# Patient Record
Sex: MALE | Race: WHITE | URBAN - METROPOLITAN AREA
[De-identification: names, ages, dates, MRNs, and addresses within clinical notes are randomized per-mention and may not be internally consistent; named-entity substitution may affect disease eponyms.]

---

## 2018-10-15 ENCOUNTER — OFFICE VISIT (OUTPATIENT)
Dept: INTERNAL MEDICINE CLINIC | Age: 33
End: 2018-10-15

## 2018-10-15 VITALS
HEART RATE: 59 BPM | TEMPERATURE: 97.8 F | OXYGEN SATURATION: 99 % | DIASTOLIC BLOOD PRESSURE: 69 MMHG | SYSTOLIC BLOOD PRESSURE: 113 MMHG | HEIGHT: 71 IN | BODY MASS INDEX: 23.24 KG/M2 | WEIGHT: 166 LBS | RESPIRATION RATE: 16 BRPM

## 2018-10-15 DIAGNOSIS — Z00.00 ROUTINE ADULT HEALTH MAINTENANCE: Primary | ICD-10-CM

## 2018-10-15 RX ORDER — ASCORBIC ACID 500 MG
TABLET ORAL DAILY
COMMUNITY

## 2018-10-15 RX ORDER — MULTIVITAMIN WITH IRON
1 TABLET ORAL DAILY
COMMUNITY

## 2018-10-15 NOTE — MR AVS SNAPSHOT
[address] 
[phone] Patient: Michelle Mendoza MRN: [id number] Visit Information Date & Time Provider Department Dept. Phone Encounter #  
 10/15/2018  9:00 AM Elisa Corrigan, [address] [id number] Follow-up Instructions Return in about 1 year (around 10/15/2019). Upcoming Health Maintenance Date Due DTaP/Tdap/Td series (1 - Tdap) 5/17/2005 Influenza Age 5 to Adult 8/1/2018 Allergies as of 10/15/2018  Review Complete On: 10/15/2018 By: Jaxon Corona III, DO Severity Noted Reaction Type Reactions Ceclor [Cefaclor]  08/09/2016    Unknown (comments) Current Immunizations  Never Reviewed No immunizations on file. Not reviewed this visit You Were Diagnosed With   
  
 Codes Comments Routine adult health maintenance    -  Primary ICD-10-CM: Z00.00 ICD-9-CM: V70.0 Vitals BP Pulse Temp Resp Height(growth percentile) Weight(growth percentile) 113/69 (BP 1 Location: Left arm, BP Patient Position: Sitting) (!) 59 97.8 °F (36.6 °C) (Oral) 16 5' 11\" (1.803 m) 166 lb (75.3 kg) SpO2 BMI Smoking Status 99% 23.15 kg/m2 Never Smoker BMI and BSA Data Body Mass Index Body Surface Area  
 23.15 kg/m 2 1.94 m 2 Preferred Pharmacy Pharm[facility] Name Phone [facility] [address] [phone] Your Updated Medication List  
  
   
This list is accurate as of 10/15/18  9:50 AM.  Always use your most recent med list.  
  
  
  
  
 DAILY MULTI-VITAMINS/IRON tablet Generic drug:  multivitamin with iron Take 1 Tab by mouth daily. VITAMIN C 500 mg tablet Generic drug:  ascorbic acid (vitamin C) Take  by mouth daily. We Performed the Following CBC WITH AUTOMATED DIFF [09383 CPT(R)] HEMOGLOBIN A1C WITH EAG [91617 CPT(R)] HIV 1/2 AG/AB, 4TH GENERATION,W RFLX CONFIRM [id number] CPT(R)] LIPID PANEL [72867 CPT(R)] METABOLIC PANEL, COMPREHENSIVE [82769 CPT(R)] TSH 3RD GENERATION [33281 CPT(R)] Follow-up Instructions Return in about 1 year (around 10/15/2019). Patient Instructions Well Visit, Ages 25 to 48: Care Instructions Your Care Instructions Physical exams can help you stay healthy. Your doctor has checked your overall health and may have suggested ways to take good care of yourself. He or she also may have recommended tests. At home, you can help prevent illness with healthy eating, regular exercise, and other steps. Follow-up care is a key part of your treatment and safety. Be sure to make and go to all appointments, and call your doctor if you are having problems. It's also a good idea to know your test results and keep a list of the medicines you take. How can you care for yourself at home? · Reach and stay at a healthy weight. This will lower your risk for many problems, such as obesity, diabetes, heart disease, and high blood pressure. · Get at least 30 minutes of physical activity on most days of the week. Walking is a good choice. You also may want to do other activities, such as running, swimming, cycling, or playing tennis or team sports. Discuss any changes in your exercise program with your doctor. · Do not smoke or allow others to smoke around you. If you need help quitting, talk to your doctor about stop-smoking programs and medicines. These can increase your chances of quitting for good. · Talk to your doctor about whether you have any risk factors for sexually transmitted infections (STIs). Having one sex partner (who does not have STIs and does not have sex with anyone else) is a good way to avoid these infections. · Use birth control if you do not want to have children at this time.  Talk with your doctor about the choices available and what might be best for you. · Protect your skin from too much sun. When you're outdoors from 10 a.m. to 4 p.m., stay in the shade or cover up with clothing and a hat with a wide brim. Wear sunglasses that block UV rays. Even when it's cloudy, put broad-spectrum sunscreen (SPF 30 or higher) on any exposed skin. · See a dentist one or two times a year for checkups and to have your teeth cleaned. · Wear a seat belt in the car. · Drink alcohol in moderation, if at all. That means no more than 2 drinks a day for men and 1 drink a day for women. Follow your doctor's advice about when to have certain tests. These tests can spot problems early. For everyone · Cholesterol. Have the fat (cholesterol) in your blood tested after age 21. Your doctor will tell you how often to have this done based on your age, family history, or other things that can increase your risk for heart disease. · Blood pressure. Have your blood pressure checked during a routine doctor visit. Your doctor will tell you how often to check your blood pressure based on your age, your blood pressure results, and other factors. · Vision. Talk with your doctor about how often to have a glaucoma test. 
· Diabetes. Ask your doctor whether you should have tests for diabetes. · Colon cancer. Have a test for colon cancer at age 48. You may have one of several tests. If you are younger than 48, you may need a test earlier if you have any risk factors. Risk factors include whether you already had a precancerous polyp removed from your colon or whether your parent, brother, sister, or child has had colon cancer. For women · Breast exam and mammogram. Talk to your doctor about when you should have a clinical breast exam and a mammogram. Medical experts differ on whether and how often women under 50 should have these tests. Your doctor can help you decide what is right for you. · Pap test and pelvic exam. Begin Pap tests at age 24. A Pap test is the best way to find cervical cancer. The test often is part of a pelvic exam. Ask how often to have this test. 
· Tests for sexually transmitted infections (STIs). Ask whether you should have tests for STIs. You may be at risk if you have sex with more than one person, especially if your partners do not wear condoms. For men · Tests for sexually transmitted infections (STIs). Ask whether you should have tests for STIs. You may be at risk if you have sex with more than one person, especially if you do not wear a condom. · Testicular cancer exam. Ask your doctor whether you should check your testicles regularly. · Prostate exam. Talk to your doctor about whether you should have a blood test (called a PSA test) for prostate cancer. Experts differ on whether and when men should have this test. Some experts suggest it if you are older than 39 and are -American or have a father or brother who got prostate cancer when he was younger than 72. When should you call for help? Watch closely for changes in your health, and be sure to contact your doctor if you have any problems or symptoms that concern you. Where can you learn more? Go to http://leandra-tiana.info/. Enter P072 in the search box to learn more about \"Well Visit, Ages 25 to 48: Care Instructions. \" Current as of: March 29, 2018 Content Version: 11.8 © 8948-1112 Healthwise, [employer]orporated. Care instructions adapted under license by [employer] (which disclaims liability or warranty for this information). If you have questions about a medical condition or this instruction, always ask your healthcare professional. [place] [address] any warranty or liability for your use of this information. Get fasting labs done, try lab trey across the street if you are unable to get them done here. Introducing [facility]! [facility] introduces [employer] patient portal. Now you can access parts of your medical record, email your doctor's office, and request medication refills online. 1. In your internet browser, go to https://[employer]. Orlebar Brown/[employer] 2. Click on the First Time User? Click Here link in the Sign In box. You will see the New Member Sign Up page. 3. Enter your [employer] Access Code exactly as it appears below. You will not need to use this code after youve completed the sign-up process. If you do not sign up before the expiration date, you must request a new code. · [employer] Access Code: [email or web address] Expires: 1/13/2019  9:50 AM 
 
4. Enter the last four digits of your Social Security Number (xxxx) and Date of Birth (mm/dd/yyyy) as indicated and click Submit. You will be taken to the next sign-up page. 5. Create a [employer] ID. This will be your [employer] login ID and cannot be changed, so think of one that is secure and easy to remember. 6. Create a [employer] password. You can change your password at any time. 7. Enter your Password Reset Question and Answer. This can be used at a later time if you forget your password. 8. Enter your e-mail address. You will receive e-mail notification when new information is available in [address] 9. Click Sign Up. You can now view and download portions of your medical record. 10. Click the Download Summary menu link to download a portable copy of your medical information. If you have questions, please visit the Frequently Asked Questions section of the [employer] website. Remember, [employer] is NOT to be used for urgent needs. For medical emergencies, dial 911. Now available from your iPhone and Android! Please provide this summary of care documentation to your next provider. Your primary care clinician is listed as Dana Ambrose If you have any questions after today's visit, please call [phone].

## 2018-10-15 NOTE — PROGRESS NOTES
Ro Gill is a 28 y.o. male who presents for evaluation of new pt visit, cpe, transfer of care. Used to see fp in [place], dr Daniel cMcray. Last saw him sept 2016. Moved to [place] recently. Has testing in nov for [facility]. Doing well, has no complaints or concerns. ROS: 
Constitutional: negative for fevers, chills, anorexia and weight loss Eyes:   negative for visual disturbance and irritation ENT:   negative for tinnitus,sore throat,nasal congestion,ear pain,hoarseness Respiratory:  negative for cough, hemoptysis, dyspnea,wheezing CV:   negative for chest pain, palpitations, lower extremity edema GI:   negative for nausea, vomiting, diarrhea, abdominal pain,melena Genitourinary: negative for frequency, dysuria and hematuria Musculoskel: negative for myalgias, arthralgias, back pain, muscle weakness, joint pain Neurological:  negative for headaches, dizziness, focal weakness, numbness Psychiatric:     Negative for depression or anxiety Past Medical History:  
Diagnosis Date  Depression  Prostatitis Past Surgical History:  
Procedure Laterality Date  HX COLONOSCOPY    
 5 yrs ago, normal except hemmorhoids, next in 10 yrs Family History Problem Relation Age of Onset  Heart Disease Father  Cancer Sister Social History Social History  Marital status:  Spouse name: N/A  
 Number of children: N/A  
 Years of education: N/A Occupational History  Not on file. Social History Main Topics  Smoking status: Never Smoker  Smokeless tobacco: Never Used  Alcohol use No  
 Drug use: No  
 Sexual activity: Yes  
  Partners: Female Birth control/ protection: Condom Other Topics Concern  Not on file Social History Narrative Visit Vitals  /69 (BP 1 Location: Left arm, BP Patient Position: Sitting)  Pulse (!) 59  Temp 97.8 °F (36.6 °C) (Oral)  Resp 16  
  Ht 5' 11\" (1.803 m)  Wt 166 lb (75.3 kg)  SpO2 99%  BMI 23.15 kg/m2 Physical Examination:  
General - Well appearing male HEENT - PERRL, TM no erythema/opacification, normal nasal turbinates, no oropharyngeal erythema or exudate, MMM Neck - supple, no bruits, no thyroidomegaly, no lymphadenopathy Pulm - clear to auscultation bilaterally Cardio - RRR, normal S1 S2, no murmur Abd - soft, nontender, no masses, no HSM Extrem - no edema, +2 distal pulses Neuro-  No focal deficits, CN intact Assessment/Plan: 1. Routine adult health maintenance--check cbc, cmp, flp, tsh, hiv, a1c Will get flu shot at work. No family hx or symptoms of colon or prostate cancers. rtc one year, sooner if labs abn.  
 
 
 
Clotilde Montoya III, DO

## 2018-10-15 NOTE — PATIENT INSTRUCTIONS

## 2018-10-20 ENCOUNTER — CLINICAL SUPPORT (OUTPATIENT)
Dept: PRIMARY CARE CLINIC | Age: 33
End: 2018-10-20

## 2018-10-20 DIAGNOSIS — Z23 ENCOUNTER FOR IMMUNIZATION: Primary | ICD-10-CM

## 2018-10-20 NOTE — PATIENT INSTRUCTIONS
Vaccine Information Statement    Influenza (Flu) Vaccine (Inactivated or Recombinant): What you need to know    Many Vaccine Information Statements are available in [place]iny[place] and other languages. See www.immunize.org/vis  Hojas de Información Sobre Vacunas están disponibles en Español y en muchos otros idiomas. Visite www.immunize.org/vis    1. Why get vaccinated? Influenza (flu) is a contagious disease that spreads around the [place] every year, usually between October and May. Flu is caused by influenza viruses, and is spread mainly by coughing, sneezing, and close contact. Anyone can get flu. Flu strikes suddenly and can last several days. Symptoms vary by age, but can include:   fever/chills   sore throat   muscle aches   fatigue   cough   headache    runny or stuffy nose    Flu can also lead to pneumonia and blood infections, and cause diarrhea and seizures in children. If you have a medical condition, such as heart or lung disease, flu can make it worse. Flu is more dangerous for some people. Infants and young children, people 72years of age and older, pregnant women, and people with certain health conditions or a weakened immune system are at greatest risk. Each year thousands of people in the [facility] die from flu, and many more are hospitalized. Flu vaccine can:   keep you from getting flu,   make flu less severe if you do get it, and   keep you from spreading flu to your family and other people. 2. Inactivated and recombinant flu vaccines    A dose of flu vaccine is recommended every flu season. Children 6 months through 6years of age may need two doses during the same flu season. Everyone else needs only one dose each flu season.        Some inactivated flu vaccines contain a very small amount of a mercury-based preservative called thimerosal. Studies have not shown thimerosal in vaccines to be harmful, but flu vaccines that do not contain thimerosal are available. There is no live flu virus in flu shots. They cannot cause the flu. There are many flu viruses, and they are always changing. Each year a new flu vaccine is made to protect against three or four viruses that are likely to cause disease in the upcoming flu season. But even when the vaccine doesnt exactly match these viruses, it may still provide some protection    Flu vaccine cannot prevent:   flu that is caused by a virus not covered by the vaccine, or   illnesses that look like flu but are not. It takes about 2 weeks for protection to develop after vaccination, and protection lasts through the flu season. 3. Some people should not get this vaccine    Tell the person who is giving you the vaccine:     If you have any severe, life-threatening allergies. If you ever had a life-threatening allergic reaction after a dose of flu vaccine, or have a severe allergy to any part of this vaccine, you may be advised not to get vaccinated. Most, but not all, types of flu vaccine contain a small amount of egg protein.  If you ever had Guillain-Barré Syndrome (also called GBS). Some people with a history of GBS should not get this vaccine. This should be discussed with your doctor.  If you are not feeling well. It is usually okay to get flu vaccine when you have a mild illness, but you might be asked to come back when you feel better. 4. Risks of a vaccine reaction    With any medicine, including vaccines, there is a chance of reactions. These are usually mild and go away on their own, but serious reactions are also possible. Most people who get a flu shot do not have any problems with it.      Minor problems following a flu shot include:    soreness, redness, or swelling where the shot was given     hoarseness   sore, red or itchy eyes   cough   fever   aches   headache   itching   fatigue  If these problems occur, they usually begin soon after the shot and last 1 or 2 days. More serious problems following a flu shot can include the following:     There may be a small increased risk of Guillain-Barré Syndrome (GBS) after inactivated flu vaccine. This risk has been estimated at 1 or 2 additional cases per million people vaccinated. This is much lower than the risk of severe complications from flu, which can be prevented by flu vaccine.  Young children who get the flu shot along with pneumococcal vaccine (PCV13) and/or DTaP vaccine at the same time might be slightly more likely to have a seizure caused by fever. Ask your doctor for more information. Tell your doctor if a child who is getting flu vaccine has ever had a seizure. Problems that could happen after any injected vaccine:      People sometimes faint after a medical procedure, including vaccination. Sitting or lying down for about 15 minutes can help prevent fainting, and injuries caused by a fall. Tell your doctor if you feel dizzy, or have vision changes or ringing in the ears.  Some people get severe pain in the shoulder and have difficulty moving the arm where a shot was given. This happens very rarely.  Any medication can cause a severe allergic reaction. Such reactions from a vaccine are very rare, estimated at about 1 in a million doses, and would happen within a few minutes to a few hours after the vaccination. As with any medicine, there is a very remote chance of a vaccine causing a serious injury or death. The safety of vaccines is always being monitored. For more information, visit: www.cdc.gov/vaccinesafety/    5. What if there is a serious reaction? What should I look for?  Look for anything that concerns you, such as signs of a severe allergic reaction, very high fever, or unusual behavior.     Signs of a severe allergic reaction can include hives, swelling of the face and throat, difficulty breathing, a fast heartbeat, dizziness, and weakness - usually within a few minutes to a few hours after the vaccination. What should I do?  If you think it is a severe allergic reaction or other emergency that cant wait, call 9-1-1 and get the person to the nearest hospital. Otherwise, call your doctor.  Reactions should be reported to the Vaccine Adverse Event Reporting System (VAERS). Your doctor should file this report, or you can do it yourself through  the VAERS web site at www.vaers. [facility].gov, or by calling 4-609.181.3355. VAERS does not give medical advice. 6. The National Vaccine Injury Compensation Program    The [facility] Vaccine Injury Compensation Program (VICP) is a federal program that was created to compensate people who may have been injured by certain vaccines. Persons who believe they may have been injured by a vaccine can learn about the program and about filing a claim by calling 7-300.989.5343 or visiting the [employer] website at www.[facility].gov/vaccinecompensation. There is a time limit to file a claim for compensation. 7. How can I learn more?  Ask your healthcare provider. He or she can give you the vaccine package insert or suggest other sources of information.  Call your local or state health department.  Contact the Centers for Disease Control and Prevention (CDC):  - Call 2-904.382.5536 (1-800-CDC-INFO) or  - Visit CDCs website at www.cdc.gov/flu    Vaccine Information Statement   Inactivated Influenza Vaccine   8/7/2015  42 MARGA Carlito Blake [id number]    Department of Health and Human Services  Centers for Disease Control and Prevention    Office Use Only

## 2018-10-25 LAB
ALBUMIN SERPL-MCNC: 4.8 G/DL (ref 3.5–5.5)
ALBUMIN/GLOB SERPL: 1.9 {RATIO} (ref 1.2–2.2)
ALP SERPL-CCNC: 61 IU/L (ref 39–117)
ALT SERPL-CCNC: 14 IU/L (ref 0–44)
AST SERPL-CCNC: 23 IU/L (ref 0–40)
BASOPHILS # BLD AUTO: 0 X10E3/UL (ref 0–0.2)
BASOPHILS NFR BLD AUTO: 1 %
BILIRUB SERPL-MCNC: 1.2 MG/DL (ref 0–1.2)
BUN SERPL-MCNC: 17 MG/DL (ref 6–20)
BUN/CREAT SERPL: 14 (ref 9–20)
CALCIUM SERPL-MCNC: 10.2 MG/DL (ref 8.7–10.2)
CHLORIDE SERPL-SCNC: 101 MMOL/L (ref 96–106)
CHOLEST SERPL-MCNC: 169 MG/DL (ref 100–199)
CO2 SERPL-SCNC: 24 MMOL/L (ref 20–29)
CREAT SERPL-MCNC: 1.24 MG/DL (ref 0.76–1.27)
EOSINOPHIL # BLD AUTO: 0.1 X10E3/UL (ref 0–0.4)
EOSINOPHIL NFR BLD AUTO: 3 %
ERYTHROCYTE [DISTWIDTH] IN BLOOD BY AUTOMATED COUNT: 13.2 % (ref 12.3–15.4)
EST. AVERAGE GLUCOSE BLD GHB EST-MCNC: 88 MG/DL
GLOBULIN SER CALC-MCNC: 2.5 G/DL (ref 1.5–4.5)
GLUCOSE SERPL-MCNC: 83 MG/DL (ref 65–99)
HBA1C MFR BLD: 4.7 % (ref 4.8–5.6)
HCT VFR BLD AUTO: 46.5 % (ref 37.5–51)
HDLC SERPL-MCNC: 44 MG/DL
HGB BLD-MCNC: 16.2 G/DL (ref 13–17.7)
HIV 1+2 AB+HIV1 P24 AG SERPL QL IA: NON REACTIVE
IMM GRANULOCYTES # BLD: 0 X10E3/UL (ref 0–0.1)
IMM GRANULOCYTES NFR BLD: 0 %
LDLC SERPL CALC-MCNC: 106 MG/DL (ref 0–99)
LYMPHOCYTES # BLD AUTO: 2.1 X10E3/UL (ref 0.7–3.1)
LYMPHOCYTES NFR BLD AUTO: 41 %
MCH RBC QN AUTO: 32.4 PG (ref 26.6–33)
MCHC RBC AUTO-ENTMCNC: 34.8 G/DL (ref 31.5–35.7)
MCV RBC AUTO: 93 FL (ref 79–97)
MONOCYTES # BLD AUTO: 0.3 X10E3/UL (ref 0.1–0.9)
MONOCYTES NFR BLD AUTO: 5 %
NEUTROPHILS # BLD AUTO: 2.6 X10E3/UL (ref 1.4–7)
NEUTROPHILS NFR BLD AUTO: 50 %
PLATELET # BLD AUTO: 196 X10E3/UL (ref 150–379)
POTASSIUM SERPL-SCNC: 4.5 MMOL/L (ref 3.5–5.2)
PROT SERPL-MCNC: 7.3 G/DL (ref 6–8.5)
RBC # BLD AUTO: 5 X10E6/UL (ref 4.14–5.8)
SODIUM SERPL-SCNC: 141 MMOL/L (ref 134–144)
TRIGL SERPL-MCNC: 95 MG/DL (ref 0–149)
TSH SERPL DL<=0.005 MIU/L-ACNC: 2.25 UIU/ML (ref 0.45–4.5)
VLDLC SERPL CALC-MCNC: 19 MG/DL (ref 5–40)
WBC # BLD AUTO: 5.1 X10E3/UL (ref 3.4–10.8)

## 2019-06-17 ENCOUNTER — OFFICE VISIT (OUTPATIENT)
Dept: PRIMARY CARE CLINIC | Age: 34
End: 2019-06-17

## 2019-06-17 VITALS
WEIGHT: 158 LBS | OXYGEN SATURATION: 98 % | HEIGHT: 71 IN | BODY MASS INDEX: 22.12 KG/M2 | RESPIRATION RATE: 16 BRPM | TEMPERATURE: 97.5 F | DIASTOLIC BLOOD PRESSURE: 52 MMHG | HEART RATE: 48 BPM | SYSTOLIC BLOOD PRESSURE: 86 MMHG

## 2019-06-17 DIAGNOSIS — L29.9 ITCHING: ICD-10-CM
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
## 2019-06-17 RX ORDER — DOXYCYCLINE 100 MG/1
100 CAPSULE ORAL 2 TIMES DAILY
Qty: 20 CAP | Refills: 0 | Status: SHIPPED | OUTPATIENT
Start: 2019-06-17 | End: 2019-06-27

## 2019-06-17 NOTE — PATIENT INSTRUCTIONS
Tick Bite: Care Instructions  Your Care Instructions    Ticks are small spiderlike animals. They bite to fasten themselves onto your skin and feed on your blood. Ticks can carry diseases. But most ticks do not carry diseases, and most tick bites do not cause serious health problems. Some people may have an allergic reaction to a tick bite. This reaction may be mild, with symptoms like itching and swelling. In rare cases, a severe allergic reaction may occur. Most of the time, all you need to do for a tick bite is relieve any symptoms you may have. Follow-up care is a key part of your treatment and safety. Be sure to make and go to all appointments, and call your doctor if you are having problems. It's also a good idea to know your test results and keep a list of the medicines you take. How can you care for yourself at home? · Put ice or a cold pack on the bite for 15 to 20 minutes once an hour. Put a thin cloth between the ice and your skin. · Try an over-the-counter medicine to relieve itching, redness, swelling, and pain. Be safe with medicines. Read and follow all instructions on the label. ? Take an antihistamine medicine, such as a nondrowsy one like loratadine (Claritin) or one that might make you sleepy like diphenhydramine (Benadryl). These medicines may help relieve itching, redness, and swelling. ? Use a spray of local anesthetic that contains benzocaine, such as Solarcaine. It may help relieve pain. If your skin reacts to the spray, stop using it. ? Put calamine lotion on the skin. It may help relieve itching. To avoid tick bites  · Avoid ticks:  ? Learn where ticks are found in your community, and stay away from those areas if possible. ? Cover as much of your body as possible when you work or play in grassy or wooded areas. ? Use insect repellents, such as products containing DEET. You can spray them on your skin.   ? Take steps to control ticks on your property if you live in an area where Lyme disease occurs. Clear leaves, brush, tall grasses, woodpiles, and stone fences from around your house and the edges of your yard or garden. This may help get rid of ticks. · When you come in from outdoors, check your body for ticks, including your groin, head, and underarms. The ticks may be about the size of a sesame seed. If no one else can help you check for ticks on your scalp, comb your hair with a fine-tooth comb. · If you find a tick, remove it quickly. Use tweezers to grasp the tick as close to its mouth (the part in your skin) as possible. Slowly pull the tick straight out--do not twist or yank--until its mouth releases from your skin. If part of the tick stays in the skin, leave it alone. It will likely come out on its own in a few days. · Ticks can come into your house on clothing, outdoor gear, and pets. These ticks can fall off and attach to you. ? Check your clothing and outdoor gear. Remove any ticks you find. Then put your clothing in a clothes dryer on high heat for 1 hour to kill any ticks that might remain. ? Check your pets for ticks after they have been outdoors. · When hiking in the woods, carry a small dry jar or ziplock bag. If you find a tick on your body, remove the tick and put it in the jar or bag. Store the container in the freezer so you can give it to your doctor if symptoms develop. The tick can be tested to learn whether it is carrying the bacteria that cause Lyme disease. When should you call for help? Call 911 anytime you think you may need emergency care. For example, call if:    · You have symptoms of a severe allergic reaction. These may include:  ? Sudden raised, red areas (hives) all over your body. ? Swelling of the throat, mouth, lips, or tongue. ? Trouble breathing. ? Passing out (losing consciousness).  Or you may feel very lightheaded or suddenly feel weak, confused, or restless.    Call your doctor now or seek immediate medical care if:    · You have signs of infection, such as:  ? Increased pain, swelling, warmth, or redness around the bite. ? Red streaks leading from the bite. ? Pus draining from the bite. ? A fever.    Watch closely for changes in your health, and be sure to contact your doctor if:    · You develop a new rash.     · You have joint pain.     · You are very tired.     · You have flu-like symptoms.     · You have symptoms for more than 1 week. Where can you learn more? Go to http://leandra-tiana.info/. Enter R490 in the search box to learn more about \"Tick Bite: Care Instructions. \"  Current as of: September 23, 2018  Content Version: 11.9  © 4329-5578 [employer]. Care instructions adapted under license by [employer] (which disclaims liability or warranty for this information). If you have questions about a medical condition or this instruction, always ask your healthcare professional. [address] any warranty or liability for your use of this information.

## 2019-06-17 NOTE — PROGRESS NOTES
Subjective:      Kaela Denny is an 27 y.o. male who presents for evaluation of a tick bite wound to the right hip area. History of bite: tick bite, attached for unknown amount of time, possibly as long as 48 hours or more. Pt states he had a scare with Lyme's disease few years ago and is interested in preemptive treatment. Mild itching to site. He denies any fevers, chills, flu-like symptoms, rash, abdominal pain, N/V. Allergies   Allergen Reactions    Ceclor [Cefaclor] Unknown (comments)     Past Medical History:   Diagnosis Date    Depression     Prostatitis      Past Surgical History:   Procedure Laterality Date    HX COLONOSCOPY      5 yrs ago, normal except hemmorhoids, next in 10 yrs        Objective:        Visit Vitals  BP (!) 86/52 (BP 1 Location: Left arm, BP Patient Position: Sitting)   Pulse (!) 48   Temp 97.5 °F (36.4 °C) (Oral)   Resp 16   Ht 5' 11\" (1.803 m)   Wt 158 lb (71.7 kg)   SpO2 98%   BMI 22.04 kg/m²     Right posterior hip area, there is a tick bite wound with minimal redness immediately surrounding site of bite, < 1cm in diameter. No retained parts. No rash. Assessment/Plan:       ICD-10-CM ICD-9-CM    1. Tick bite, initial encounter W57. XXXA 919.4      E906.4    2. Itching L29.9 698.9      Orders Placed This Encounter    doxycycline (MONODOX) 100 mg capsule     As pt was leaving office, he identified the tick as a lone star tick and had additional questions about preventing other tickborne diseases. Further discussion regarding this. He has no symptoms at this time, discussed doxy. Discussed sx's to monitor for. RTC prn. Gamaliel Youngblood, JARED  This note will not be viewable in [address]

## 2019-06-17 NOTE — PROGRESS NOTES
Identified pt with two pt identifiers(name and ). Reviewed record in preparation for visit and have obtained necessary documentation. All patient medications has been reviewed. Chief Complaint   Patient presents with    Tick Removal     pt is concerned about specific tick. Upper right hip       Health Maintenance Due   Topic    DTaP/Tdap/Td series (1 - Tdap)       Vitals:    19 0831   BP: (!) 86/52   Pulse: (!) 48   Resp: 16   Temp: 97.5 °F (36.4 °C)   TempSrc: Oral   SpO2: 98%   Weight: 158 lb (71.7 kg)   Height: 5' 11\" (1.803 m)   PainSc:   0 - No pain       Coordination of Care Questionnaire:   1) Have you been to an emergency room, urgent care, or hospitalized since your last visit?   no       2. Have seen or consulted any other health care provider since your last visit? YES  Urologist  3) Do you have an Advanced Directive/ Living Will in place? NO  If yes, do we have a copy on file NO  If no, would you like information NO    Patient is accompanied by self I have received verbal consent from Clau Pereyra to discuss any/all medical information while they are present in the room.

## 2019-08-05 ENCOUNTER — OFFICE VISIT (OUTPATIENT)
Dept: PRIMARY CARE CLINIC | Age: 34
End: 2019-08-05

## 2019-08-05 VITALS
SYSTOLIC BLOOD PRESSURE: 98 MMHG | TEMPERATURE: 98.5 F | HEIGHT: 71 IN | OXYGEN SATURATION: 97 % | DIASTOLIC BLOOD PRESSURE: 59 MMHG | WEIGHT: 155 LBS | BODY MASS INDEX: 21.7 KG/M2 | HEART RATE: 57 BPM | RESPIRATION RATE: 16 BRPM
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
## 2019-08-05 NOTE — PROGRESS NOTES
Jonathan Henriquez is a 27 y.o. male  Chief Complaint   Patient presents with    Tick Removal     follow up, has not healed and remains itchy     Visit Vitals  BP 98/59 (BP 1 Location: Left arm, BP Patient Position: Sitting)   Pulse (!) 57   Temp 98.5 °F (36.9 °C) (Oral)   Resp 16   Ht 5' 11\" (1.803 m)   Wt 155 lb (70.3 kg)   SpO2 97%   BMI 21.62 kg/m²       Pain Scale: 0 - No pain/10  Pain Location:   1. Have you been to the ER, urgent care clinic since your last visit? Hospitalized since your last visit? No    2. Have you seen or consulted any other health care providers outside of the [address] [place] [address] since your last visit? Include any pap smears or colon screening.  No

## 2019-08-08 NOTE — PATIENT INSTRUCTIONS
Tick Bite: Care Instructions  Your Care Instructions    Ticks are small spiderlike animals. They bite to fasten themselves onto your skin and feed on your blood. Ticks can carry diseases. But most ticks do not carry diseases, and most tick bites do not cause serious health problems. Some people may have an allergic reaction to a tick bite. This reaction may be mild, with symptoms like itching and swelling. In rare cases, a severe allergic reaction may occur. Most of the time, all you need to do for a tick bite is relieve any symptoms you may have. Follow-up care is a key part of your treatment and safety. Be sure to make and go to all appointments, and call your doctor if you are having problems. It's also a good idea to know your test results and keep a list of the medicines you take. How can you care for yourself at home? · Put ice or a cold pack on the bite for 15 to 20 minutes once an hour. Put a thin cloth between the ice and your skin. · Try an over-the-counter medicine to relieve itching, redness, swelling, and pain. Be safe with medicines. Read and follow all instructions on the label. ? Take an antihistamine medicine, such as a nondrowsy one like loratadine (Claritin) or one that might make you sleepy like diphenhydramine (Benadryl). These medicines may help relieve itching, redness, and swelling. ? Use a spray of local anesthetic that contains benzocaine, such as Solarcaine. It may help relieve pain. If your skin reacts to the spray, stop using it. ? Put calamine lotion on the skin. It may help relieve itching. To avoid tick bites  · Avoid ticks:  ? Learn where ticks are found in your community, and stay away from those areas if possible. ? Cover as much of your body as possible when you work or play in grassy or wooded areas. ? Use insect repellents, such as products containing DEET. You can spray them on your skin.   ? Take steps to control ticks on your property if you live in an area where Lyme disease occurs. Clear leaves, brush, tall grasses, woodpiles, and stone fences from around your house and the edges of your yard or garden. This may help get rid of ticks. · When you come in from outdoors, check your body for ticks, including your groin, head, and underarms. The ticks may be about the size of a sesame seed. If no one else can help you check for ticks on your scalp, comb your hair with a fine-tooth comb. · If you find a tick, remove it quickly. Use tweezers to grasp the tick as close to its mouth (the part in your skin) as possible. Slowly pull the tick straight out--do not twist or yank--until its mouth releases from your skin. If part of the tick stays in the skin, leave it alone. It will likely come out on its own in a few days. · Ticks can come into your house on clothing, outdoor gear, and pets. These ticks can fall off and attach to you. ? Check your clothing and outdoor gear. Remove any ticks you find. Then put your clothing in a clothes dryer on high heat for 1 hour to kill any ticks that might remain. ? Check your pets for ticks after they have been outdoors. When should you call for help? Call 911 anytime you think you may need emergency care. For example, call if:    · You have symptoms of a severe allergic reaction. These may include:  ? Sudden raised, red areas (hives) all over your body. ? Swelling of the throat, mouth, lips, or tongue. ? Trouble breathing. ? Passing out (losing consciousness). Or you may feel very lightheaded or suddenly feel weak, confused, or restless.    Call your doctor now or seek immediate medical care if:    · You have signs of infection, such as:  ? Increased pain, swelling, warmth, or redness around the bite. ? Red streaks leading from the bite. ? Pus draining from the bite.   ? A fever.    Watch closely for changes in your health, and be sure to contact your doctor if:    · You develop a new rash.     · You have joint pain.     · You are very tired.     · You have flu-like symptoms.     · You have symptoms for more than 1 week. Where can you learn more? Go to http://leandra-tiana.info/. Enter G136 in the search box to learn more about \"Tick Bite: Care Instructions. \"  Current as of: September 23, 2018  Content Version: 12.1  © 6504-6433 [employer]. Care instructions adapted under license by [employer] (which disclaims liability or warranty for this information). If you have questions about a medical condition or this instruction, always ask your healthcare professional. [place] [address] any warranty or liability for your use of this information.

## 2019-08-08 NOTE — PROGRESS NOTES
HPI  Arsh Disla is a 27 y.o. male who presents for re-evaluation of a tick bite, there is no signs of infection, the area is healing well, pt to continue to keep clean and dry    PMHx:  Past Medical History:   Diagnosis Date    Depression     Prostatitis        Meds:   Current Outpatient Medications   Medication Sig Dispense Refill    ascorbic acid, vitamin C, (VITAMIN C) 500 mg tablet Take  by mouth daily.  multivitamin with iron (DAILY MULTI-VITAMINS/IRON) tablet Take 1 Tab by mouth daily. Allergies: Allergies   Allergen Reactions    Ceclor [Cefaclor] Unknown (comments)       Smoker:  Social History     Tobacco Use   Smoking Status Never Smoker   Smokeless Tobacco Never Used       ETOH:   Social History     Substance and Sexual Activity   Alcohol Use No       FH:   Family History   Problem Relation Age of Onset    Heart Disease Father     Cancer Sister        Physical Exam:  Visit Vitals  BP 98/59 (BP 1 Location: Left arm, BP Patient Position: Sitting)   Pulse (!) 57   Temp 98.5 °F (36.9 °C) (Oral)   Resp 16   Ht 5' 11\" (1.803 m)   Wt 155 lb (70.3 kg)   SpO2 97%   BMI 21.62 kg/m²     GEN: No apparent distress. Alert and oriented and responds to all questions appropriately. EYES:  Conjunctiva clear; pupils round and reactive to light; extraocular movements are intact. EAR: External ears are normal.  Tympanic membranes are clear and without effusion. NOSE: Turbinates are within normal limits. No drainage  OROPHYARYNX: No oral lesions or exudates. NECK:  Supple; no masses; thyroid normal           LUNGS: Respirations unlabored; clear to auscultation bilaterally  CARDIOVASCULAR: Regular, rate, and rhythm without murmurs, gallops or rubs   ABDOMEN: Soft; nontender; nondistended; normoactive bowel sounds; no masses or organomegaly  NEUROLOGIC:  No focal neurologic deficits. Strength and sensation grossly intact. Coordination and gait grossly intact. EXT: Well perfused.  No edema.  SKIN: No obvious rashes. Assessment and Plan     See above      ICD-10-CM ICD-9-CM    1. Bug bite, subsequent encounter W57. XXXD V58.89      919.4          Spoke with the patient regarding their blood pressure (BP) reading at today's visit. The patient verbalized understanding of need to maintain BP lower than 140/90. The patient will follow up with their primary care physician regarding management and/or medications that may be needed. Drepssion Screening has been completed. The patient isdenies having a history of depression. The patient is nottaking medication and is being followed by their PCP at this time. This patient does  have a primary care physician. Referral was not given a referral at todays visit. Immunizations: The patient is current on their influenza immunization at this time. The patient does not   want to receive the influenza immunization today. Follow up instructions given at today's visit were verbalized by the patient/parent. The signs of infection are fever > 100.4, increase fatigue, change in mental status, or decrease in urinary output. The patient/parent verbalized understanding of taking medications prescribed during this visit as prescribed.

## 2019-10-05 ENCOUNTER — CLINICAL SUPPORT (OUTPATIENT)
Dept: PRIMARY CARE CLINIC | Age: 34
End: 2019-10-05

## 2019-10-05 DIAGNOSIS — Z23 ENCOUNTER FOR IMMUNIZATION: Primary | ICD-10-CM

## 2019-10-07 NOTE — PROGRESS NOTES
Chief Complaint   Patient presents with    Immunization/Injection     Placement of Influenza vaccine     Immunization/s administered 10/7/2019 by Seven Horne LPN with guardian's consent. Patient tolerated procedure well. No reactions noted.

## 2019-10-07 NOTE — PATIENT INSTRUCTIONS
Vaccine Information Statement    Influenza (Flu) Vaccine (Inactivated or Recombinant): What You Need to Know    Many Vaccine Information Statements are available in [place]ian and other languages. See www.immunize.org/vis  Hojas de información sobre vacunas están disponibles en español y en muchos otros idiomas. Visite www.immunize.org/vis    1. Why get vaccinated? Influenza vaccine can prevent influenza (flu). Flu is a contagious disease that spreads around the [place] every year, usually between October and May. Anyone can get the flu, but it is more dangerous for some people. Infants and young children, people 72years of age and older, pregnant women, and people with certain health conditions or a weakened immune system are at greatest risk of flu complications. Pneumonia, bronchitis, sinus infections and ear infections are examples of flu-related complications. If you have a medical condition, such as heart disease, cancer or diabetes, flu can make it worse. Flu can cause fever and chills, sore throat, muscle aches, fatigue, cough, headache, and runny or stuffy nose. Some people may have vomiting and diarrhea, though this is more common in children than adults. Each year thousands of people in the [facility] die from flu, and many more are hospitalized. Flu vaccine prevents millions of illnesses and flu-related visits to the doctor each year. 2. Influenza vaccines     CDC recommends everyone 10months of age and older get vaccinated every flu season. Children 6 months through 6years of age may need 2 doses during a single flu season. Everyone else needs only 1 dose each flu season. It takes about 2 weeks for protection to develop after vaccination. There are many flu viruses, and they are always changing. Each year a new flu vaccine is made to protect against three or four viruses that are likely to cause disease in the upcoming flu season.  Even when the vaccine doesnt exactly match these viruses, it may still provide some protection. Influenza vaccine does not cause flu. Influenza vaccine may be given at the same time as other vaccines. 3. Talk with your health care provider    Tell your vaccine provider if the person getting the vaccine:   Has had an allergic reaction after a previous dose of influenza vaccine, or has any severe, life-threatening allergies.  Has ever had Guillain-Barré Syndrome (also called GBS). In some cases, your health care provider may decide to postpone influenza vaccination to a future visit. People with minor illnesses, such as a cold, may be vaccinated. People who are moderately or severely ill should usually wait until they recover before getting influenza vaccine. Your health care provider can give you more information. 4. Risks of a reaction     Soreness, redness, and swelling where shot is given, fever, muscle aches, and headache can happen after influenza vaccine.  There may be a very small increased risk of Guillain-Barré Syndrome (GBS) after inactivated influenza vaccine (the flu shot). [facility] children who get the flu shot along with pneumococcal vaccine (PCV13), and/or DTaP vaccine at the same time might be slightly more likely to have a seizure caused by fever. Tell your health care provider if a child who is getting flu vaccine has ever had a seizure. People sometimes faint after medical procedures, including vaccination. Tell your provider if you feel dizzy or have vision changes or ringing in the ears. As with any medicine, there is a very remote chance of a vaccine causing a severe allergic reaction, other serious injury, or death. 5. What if there is a serious problem? An allergic reaction could occur after the vaccinated person leaves the clinic.  If you see signs of a severe allergic reaction (hives, swelling of the face and throat, difficulty breathing, a fast heartbeat, dizziness, or weakness), call 9-1-1 and get the person to the nearest hospital.    For other signs that concern you, call your health care provider. Adverse reactions should be reported to the Vaccine Adverse Event Reporting System (VAERS). Your health care provider will usually file this report, or you can do it yourself. Visit the VAERS website at www.vaers. [facility].gov or call 1-819.565.9641. VAERS is only for reporting reactions, and VAERS staff do not give medical advice. 6. The National Vaccine Injury Compensation Program    The [facility] Vaccine Injury Compensation Program (VICP) is a federal program that was created to compensate people who may have been injured by certain vaccines. Visit the VICP website at www.[facility].gov/vaccinecompensation or call 2-327.812.5409 to learn about the program and about filing a claim. There is a time limit to file a claim for compensation. 7. How can I learn more?  Ask your health care provider.  Call your local or state health department.  Contact the Centers for Disease Control and Prevention (CDC):  - Call 8-572.854.2547 (1-800-CDC-INFO) or  - Visit CDCs influenza website at www.cdc.gov/flu    Vaccine Information Statement (Interim)  Inactivated Influenza Vaccine   8/15/2019  42 MARGA Hurst [id number]   Department of Health and Human Services  Centers for Disease Control and Prevention    Office Use Only

## 2019-10-30 ENCOUNTER — OFFICE VISIT (OUTPATIENT)
Dept: PRIMARY CARE CLINIC | Age: 34
End: 2019-10-30

## 2019-10-30 VITALS
HEART RATE: 57 BPM | SYSTOLIC BLOOD PRESSURE: 105 MMHG | OXYGEN SATURATION: 95 % | HEIGHT: 71 IN | RESPIRATION RATE: 16 BRPM | DIASTOLIC BLOOD PRESSURE: 70 MMHG | BODY MASS INDEX: 21.84 KG/M2 | TEMPERATURE: 98.1 F | WEIGHT: 156 LBS

## 2019-10-30 DIAGNOSIS — H57.89 IRRITATION OF BOTH EYES: Primary | ICD-10-CM

## 2019-10-30 DIAGNOSIS — H57.89 EYE REDNESS: ICD-10-CM

## 2019-10-30 RX ORDER — MOXIFLOXACIN 5 MG/ML
1 SOLUTION/ DROPS OPHTHALMIC 3 TIMES DAILY
Qty: 1 BOTTLE | Refills: 0 | Status: SHIPPED | OUTPATIENT
Start: 2019-10-30 | End: 2019-11-06

## 2019-10-30 NOTE — PROGRESS NOTES
HISTORY OF PRESENT ILLNESS  Britta Saldana is a 27 y.o. male. HPI  Chief Complaint   Patient presents with    Eye Problem     Pt presents with complaints of bilateral eye irritation and redness for 1 day. Started this morning. Eyes feel dry and irritated. No discharge. Had sore throat 2-3 days ago but resolved. Denies fevers, congestion, or nasal discharge. Wears contact lenses, changes every 2 weeks. Denies any sick contacts. 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 Reports fatigue and eye strain. Past Medical History:   Diagnosis Date    Depression     Prostatitis      Past Surgical History:   Procedure Laterality Date    HX COLONOSCOPY      5 yrs ago, normal except hemmorhoids, next in 10 yrs     Allergies   Allergen Reactions    Ceclor [Cefaclor] Unknown (comments)         ROS  A comprehensive review of systems was obtained and negative except findings in the HPI    Physical Exam   Constitutional: He appears well-developed and well-nourished. No distress. HENT:   Head: Normocephalic and atraumatic. Nose: Nose normal.   Eyes: Pupils are equal, round, and reactive to light. Right eye exhibits no discharge and no hordeolum. No foreign body present in the right eye. Left eye exhibits no discharge and no hordeolum. No foreign body present in the left eye. Right conjunctiva is not injected. Left conjunctiva is injected. No scleral icterus. Amblyopia noted to left and possibly on right as well. Left eye injected and excessive lacrimation, no discharge. Right eye appears normal.   Skin: He is not diaphoretic. ASSESSMENT and PLAN    ICD-10-CM ICD-9-CM    1. Irritation of both eyes H57.89 379.99    2. Eye redness H57.89 379.93    -contact lens wearer    Orders Placed This Encounter    ubidecarenone (COQ-10 PO)    moxifloxacin (VIGAMOX) 0.5 % ophthalmic solution     Will cover with antibiotic eye drops, but may only be eye strain.   Discard contact lenses and no use until sx's completely resolve. See eye specialist if no improvement or any worsening symptoms in next 24-48 hours. Sharonda Olvera NP  This note will not be viewable in [address]

## 2019-10-30 NOTE — PATIENT INSTRUCTIONS
Soft Contact Lenses: Care Instructions Your Care Instructions It's important to take good care of your contact lenses. If you clean them every day, you can prevent sore or infected eyes. This is because cleaning removes harmful chemicals and bacteria. There are two types of soft contact lenses. · Daily-wear lenses are made to be worn for one day, then thrown away. You use fresh lenses each day, so you don't need to clean them. · Extended-wear lenses are made to last longer. You clean them each day you wear them. The label may say they can be worn day and night, but wearing your lenses while sleeping can cause infection and eye damage. To protect your eyes, it's usually best to take your lenses out before sleeping. If you wear contacts, it's a good idea to also have a pair of glasses. You can wear the glasses if your contacts are irritating your eyes. Follow-up care is a key part of your treatment and safety. Be sure to make and go to all appointments, and call your doctor if you are having problems. It's also a good idea to know your test results and keep a list of the medicines you take. How can you care for yourself at home? · Wash your hands before you take out or put in your contacts. · Clean your lens case every day. Let it dry completely. · Wear your lenses only as long as directed. Your doctor may give you special instructions. · Take out your daily-wear lenses or extended-wear lenses before sleeping. This helps prevent serious eye problems. · Follow the cleaning directions for your lenses. · You may use the same solution to clean and store your lenses. Or you may use a separate cleaning product and then rinse your lenses with saline solution. You can buy these products over the counter at most drugstores. Do not make your own saline. It can carry bacteria. If your doctor recommends a solution, use that brand. · Put in your contacts before you put on eye makeup.  Get new eye makeup every 3 to 6 months. This will help prevent infections. · Visit your doctor once a year. He or she can check your lenses and your eyes. · Use a sterile saline solution or rewetting drops if your contacts become dry and irritate your eyes. · It is a good idea to carry an extra contact storage case and storage solution with you. Then you will be ready if you need to take out your contacts at any time. · Do not wear contacts when you swim, shower, or use a hot tub. This will help prevent infections. When should you call for help? Call your doctor now or seek immediate medical care if: 
  · You have signs of an eye infection, such as: 
? Pus or thick discharge coming from the eye. 
? Redness or swelling around the eye. 
? A fever.  
  · You have new or worse eye pain.  
  · You have vision changes.  
  · It feels like there is something in your eye.  
  · Light hurts your eye.  
 Watch closely for changes in your health, and be sure to contact your doctor if you have any problems. Where can you learn more? Go to http://leandra-tiana.info/. Enter H171 in the search box to learn more about \"Soft Contact Lenses: Care Instructions. \" Current as of: May 5, 2019 Content Version: 12.2 © 1074-8626 [employer]. Care instructions adapted under license by [employer] (which disclaims liability or warranty for this information). If you have questions about a medical condition or this instruction, always ask your healthcare professional. [place] [address] any warranty or liability for your use of this information.

## 2019-10-30 NOTE — PROGRESS NOTES
Pt c/o bilateral eye irritation. Denies discharge. Pt states both eyes feel the same. States puffiness. Denies pain and itchiness. Denies any real visual changes.